# Patient Record
Sex: MALE | Race: WHITE | NOT HISPANIC OR LATINO
[De-identification: names, ages, dates, MRNs, and addresses within clinical notes are randomized per-mention and may not be internally consistent; named-entity substitution may affect disease eponyms.]

---

## 2021-09-29 PROBLEM — Z00.00 ENCOUNTER FOR PREVENTIVE HEALTH EXAMINATION: Status: ACTIVE | Noted: 2021-09-29

## 2021-10-05 ENCOUNTER — APPOINTMENT (OUTPATIENT)
Dept: NEUROSURGERY | Facility: CLINIC | Age: 81
End: 2021-10-05
Payer: MEDICARE

## 2021-10-05 DIAGNOSIS — G96.08 OTHER CRANIAL CEREBROSPINAL FLUID LEAK: ICD-10-CM

## 2021-10-05 PROCEDURE — 99205 OFFICE O/P NEW HI 60 MIN: CPT

## 2021-10-05 NOTE — ASSESSMENT
[FreeTextEntry1] : Mr. Tapia presents with a history of bilateral frontoparietal chronic subdural hematomas status post fall in August 2020. He is asymptomatic. CT head from 9/23/21 demonstrates an slight interval  DECREASE in size of the right frontoparietal subdural collection with new subacute blood products as well and a slight interval INCREASE in size of left frontoparietal subdural collection with interval decrease in blood product evolution. CT head reviewed independently by me and compared directly with prior CTs demonstrates stable volume of bilateral subdural fluid collections. \par \par Natural history discussed at length. Alternative management strategies reviewed, including continued observation with serial imaging, surgical evacuation, endovascular middle meningeal artery embolization. \par I have recommended short term surveillance with CT head without contrast in 2 weeks time with an appointment to follow. \par \par I have asked the patient and his wife to contact me for any symptomatic development or progression in the interim at which time we can obtain expedite follow up imaging, or for any additional questions or concerns that arise.\par \par A total of 60 minutes were spent relative to this encounter. \par

## 2021-10-05 NOTE — HISTORY OF PRESENT ILLNESS
[de-identified] : Mr. Tapia presents in neurosurgical consultation at the request of Dr. Mayank Sexton with his wife in attendance. He had a PMHx of chronic mild orthostatic hypotension, HLD,  and peptic ulcer disease. Patient states that in August of 2020 he presented to the emergency department at [facility] for evaluation of lightheadedness and syncope. A syncopal episode resulted in the patient striking the back of his head against the wall. Patient states that prior to his fall he had be experiencing watery diarrhea for several days which they presume led to an orthostatic hypotensive episode as well as GI bleeding with significant blood loss requiring a blood transfusion. CT head (8/28/20) at that time revealed bilateral subdural hygromas which were noted to have been present since 2017). Subsequent MRI brain revealed chronic bilateral subdural collections without significant mass effect in addition to evidence for small vessel ischemic changes. He presented with confusion throughout his hospital course with a normal electroencephalogram and was discharged home on 8/30/20 with no interval CT changes. Post admission serial CT imaging was managed under the direction of Dr. Sexton.  Recent CT head on 9/23 suggested a change in density and possible acuity of subdural blood products.  As per the patient, there has been no interval history of trauma. Repeat CT head performed today and will be reviewed below. \par \par Today he reports he just recently retired from being a  at [employer] and denies any neurological symptoms at this time.  \par \par \par \par

## 2021-10-05 NOTE — DATA REVIEWED
[de-identified] : CT HEAD WITHOUT CONTRAST: 10/4/2021: IMPRESSION- Bilateral frontoparietal subdural hematomas not significantly changed in size though both mildly increased in density compared to prior exam. The latter finding suggests interval component of more recent blood products\par CT HEAD WITHOUT CONTRAST: 9/23/2021: IMPRESSION- Slight interval decrease in size of right frontoparietal subdural collection though with mild interval increase in attenuation suggesting component of degeneration/ subacute blood products as well as a tiny hyperdense focus suggestive of acute blood products. Slight interval increase in size of left frontoparietal subdural collection with interval decrease in attenuation suggesting further evolution/ degeneration of blood products. \par CT HEAD WITHOUT CONTRAST: 8/28/20 - IMPRESSION :Prominence of the right greater than left frontal convexity CSF extra-axial\par space what appears to be mild mass effect on the frontal cortex, right greater\par than left. 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 [de-identified] : MRI BRAIN WITHOUT IV CONTRAST : 8/29/2020: IMPRESSION - 1.  Findings consistent with chronic bilateral subdural hematomas.\par \par 2.  No mass effect, significant midline shift or acute intracranial\par hemorrhage.\par \par 3.  There is no restricted diffusion to indicate an acute infarction.\par \par 4.  Involutional changes with parenchymal volume loss.\par \par 5.  Focal areas of abnormal white matter signal about the occipital horns of\par both lateral ventricles, which are most consistent with nonspecific gliosis.

## 2021-10-07 ENCOUNTER — TRANSCRIPTION ENCOUNTER (OUTPATIENT)
Age: 81
End: 2021-10-07

## 2021-10-26 ENCOUNTER — APPOINTMENT (OUTPATIENT)
Dept: NEUROSURGERY | Facility: CLINIC | Age: 81
End: 2021-10-26
Payer: MEDICARE

## 2021-10-26 PROCEDURE — 99215 OFFICE O/P EST HI 40 MIN: CPT | Mod: 95

## 2021-10-26 NOTE — DATA REVIEWED
[de-identified] : CT HEAD WITHOUT CONTRAST: 10/22/2021: IMPRESSION - Stable size and extent of the bilateral subdural collections as compared to \par  the imported exam from [facility] dated October 4, 2021. However, there \par  is slightly increased density within the right subdural collection which may \par  reflect interval hemorrhage \par

## 2021-10-26 NOTE — HISTORY OF PRESENT ILLNESS
[FreeTextEntry1] : Mr. Tapia has agreed to two-way audiovisual telehealth consultation. He is located at his  home [address] [place] [address]  and I am located at my office at [facility].\par \par Mr. Tapia presents for a a followup visit. He has no new neurologic symptoms.\par

## 2021-12-06 ENCOUNTER — NON-APPOINTMENT (OUTPATIENT)
Age: 81
End: 2021-12-06

## 2021-12-06 ENCOUNTER — APPOINTMENT (OUTPATIENT)
Dept: NEUROSURGERY | Facility: CLINIC | Age: 81
End: 2021-12-06
Payer: MEDICARE

## 2021-12-06 PROCEDURE — 99215 OFFICE O/P EST HI 40 MIN: CPT

## 2021-12-13 ENCOUNTER — APPOINTMENT (OUTPATIENT)
Dept: NEUROSURGERY | Facility: HOSPITAL | Age: 81
End: 2021-12-13

## 2021-12-16 ENCOUNTER — APPOINTMENT (OUTPATIENT)
Dept: NEUROSURGERY | Facility: HOSPITAL | Age: 81
End: 2021-12-16

## 2021-12-19 ENCOUNTER — RESULT REVIEW (OUTPATIENT)
Age: 81
End: 2021-12-19

## 2021-12-22 ENCOUNTER — RESULT REVIEW (OUTPATIENT)
Age: 81
End: 2021-12-22

## 2021-12-22 ENCOUNTER — APPOINTMENT (OUTPATIENT)
Dept: NEUROSURGERY | Facility: HOSPITAL | Age: 81
End: 2021-12-22

## 2021-12-23 ENCOUNTER — RESULT REVIEW (OUTPATIENT)
Age: 81
End: 2021-12-23

## 2021-12-23 ENCOUNTER — NON-APPOINTMENT (OUTPATIENT)
Age: 81
End: 2021-12-23

## 2021-12-23 DIAGNOSIS — S06.5X9A TRAUMATIC SUBDURAL HEMORRHAGE WITH LOSS OF CONSCIOUSNESS OF UNSPECIFIED DURATION, INITIAL ENCOUNTER: ICD-10-CM

## 2022-01-03 ENCOUNTER — NON-APPOINTMENT (OUTPATIENT)
Age: 82
End: 2022-01-03

## 2022-01-03 DIAGNOSIS — I82.499 ACUTE EMBOLISM AND THROMBOSIS OF OTHER SPECIFIED DEEP VEIN OF UNSPECIFIED LOWER EXTREMITY: ICD-10-CM

## 2022-01-18 ENCOUNTER — APPOINTMENT (OUTPATIENT)
Dept: NEUROSURGERY | Facility: CLINIC | Age: 82
End: 2022-01-18
Payer: MEDICARE

## 2022-01-18 PROCEDURE — 99215 OFFICE O/P EST HI 40 MIN: CPT

## 2022-01-18 NOTE — PHYSICAL EXAM
[Oriented To Time, Place, And Person] : oriented to person, place, and time [Cranial Nerves Optic (II)] : visual acuity intact bilaterally,  pupils equal round and reactive to light [Cranial Nerves Oculomotor (III)] : extraocular motion intact [Cranial Nerves Trigeminal (V)] : facial sensation intact symmetrically [Cranial Nerves Facial (VII)] : face symmetrical [Cranial Nerves Vestibulocochlear (VIII)] : hearing was intact bilaterally [Cranial Nerves Glossopharyngeal (IX)] : tongue and palate midline [Cranial Nerves Accessory (XI - Cranial And Spinal)] : head turning and shoulder shrug symmetric [Cranial Nerves Hypoglossal (XII)] : there was no tongue deviation with protrusion [Motor Strength] : muscle strength was normal in all four extremities [General Appearance - Alert] : alert [General Appearance - In No Acute Distress] : in no acute distress [General Appearance - Well Nourished] : well nourished [General Appearance - Well Developed] : well developed [Sensation Tactile Decrease] : light touch was intact [Balance] : balance was intact [FreeTextEntry1] : right groin: soft, non-tender, no ecchymosis.  2+ distal pulses

## 2022-01-18 NOTE — HISTORY OF PRESENT ILLNESS
[FreeTextEntry1] : Mr. Tapia presents today in neurosurgical follow up.  He underwent diagnostic cerebral angiography with successful bilateral middle meningeal artery 12/22/21 without complication for bilateral enlarging subacute on chronic subdural hematomas. He presents with surveillance imaging, which will be reviewed below.\par \par Approximately two weeks ago he began experiencing pain in the right knee, which was the same limb accessed for the procedure.  He underwent lower extremity venous duplex which ruled out presence of DVT on 1/4.  His symptoms abated but however last week he began experiencing right ankle pain and swelling.  He sought consultation from his PCP and was diagnosed with a right ankle sprain.\par \par Today, patient states his ankle pain and swelling have improved. Denies headaches, visual disturbances, extremity numbness/weakness, gait instability.

## 2022-01-18 NOTE — DATA REVIEWED
[de-identified] : PROCEDURE: CT HEAD OR BRAIN \par \par ORDER #: [id number] \par CC: ; ; ; \par End of cc's \par \par CT SCAN OF THE HEAD WITHOUT CONTRAST: \par \par  HISTORY:81 years Male S06.5X9ACT \par \par  TECHNIQUE: CT head performed using 5mm axial collimation Helical scanning was \par  performed without contrast administration. Reformatted images in the coronal \par  and sagittal plane were produced. Automated exposure control (AEC) adjustment \par  of the mA and/or KVP has been used according to patient's size for the purpose \par  of dose reduction. \par \par \par  COMPARISON: December 23, 2021. \par \par  FINDINGS: \par \par  Redemonstrated on today's exam is a complex mixed density right subdural \par  collection which appears overall decreased in attenuation as compared to the \par  December 23, 2021 exam but stable or slightly improved in size. At greatest \par  radial depth the collection measures 1.4 cm overlying the right frontoparietal \par  junction as compared to 17 mm on the prior exam. \par \par  A smaller left convexity mixed density subdural collection is present which at \par  maximal radial thickness measures up to 6.5 mm as compared to 7.5 mm on the \par  previous exam. Subtle leftward midline shift is again seen, less than 5 mm and \par  relatively stable as compared to prior exam. \par \par  No new intraparenchymal hemorrhage or hydrocephalus is present. \par \par  IMPRESSION: \par \par  Mixed density bilateral subdural collections larger on the right than the left \par  are stable or slightly improved as compared to the December 23, 2021 exam with \par  interval decrease in density consistent with evolution of blood products and \par  probably subtle decrease in size bilaterally resulting in stable minimal \par  leftward midline shift.. \par \par  Thank you for allowing us to participate in the evaluation of this patient. \par \par  --- End of Report --- \par \par  Electronically Signed: \par  ____________________________________________ \par  Leonard Arora MD \par  01/14/22 1122

## 2022-01-18 NOTE — ASSESSMENT
[FreeTextEntry1] : Mr. Tapia presents today s/p successful bilateral middle meningeal arteriy embolization for treatment of bilateral, enlarging subacute on chronic subdural hematomas.  Follow up CT reviewed independently by me demonstrates  interval improvement in volume of subdural blood and fluid products. \par \par I plan to obtain surveillance CT head in 3 weeks with an appointment to follow.  I have asked the patient to contact me for any symptomatic development or progression at which time we can obtain expedited follow up imaging.\par \par Approximately 45 minutes were spent relative to this encounter. \par

## 2022-02-08 ENCOUNTER — APPOINTMENT (OUTPATIENT)
Dept: NEUROSURGERY | Facility: CLINIC | Age: 82
End: 2022-02-08

## 2022-02-10 ENCOUNTER — APPOINTMENT (OUTPATIENT)
Dept: NEUROSURGERY | Facility: CLINIC | Age: 82
End: 2022-02-10
Payer: MEDICARE

## 2022-02-10 PROCEDURE — XXXXX: CPT | Mod: 1L

## 2022-02-10 NOTE — ASSESSMENT
[FreeTextEntry1] : Mr. Tapia presents today with a history of successful bilateral middle meningeal artery embolization for treatment of bilateral, enlarging subacute on chronic subdural hematomas. He remains stable clinically and radiographically. Follow up CT reviewed, demonstrates interval improvement in volume of subdural blood.\par \par I plan to obtain surveillance noncontrast CT head in 3 weeks with an appointment to follow. I have asked the patient to contact me for any symptomatic development or progression at which time we can obtain expedited follow up imaging.\par

## 2022-02-10 NOTE — HISTORY OF PRESENT ILLNESS
[FreeTextEntry1] : Mr. Tapia has agreed to two-way audiovisual telehealth consultation. She is located at her home [address] [place] [address] and I am located at my office at [facility].\par \par \par \par \par  presents today in neurosurgical follow up .Previous notes and interval history reviewed. Denies neurological symptoms. He presents with surveillance imaging, noncontrast CT head from 2/8/22, detailed below.

## 2022-02-10 NOTE — DATA REVIEWED
[de-identified] : CT HEAD NO CONTRAST : 2/8/2022: IMPRESSION:  Stable right convexity mixed density collection and stable much smaller left \par  convexity collection as compared to January 13, 2022 study. \par \par  Thank you for allowing us to participate in the evaluation of this patient

## 2022-03-08 ENCOUNTER — APPOINTMENT (OUTPATIENT)
Dept: NEUROSURGERY | Facility: CLINIC | Age: 82
End: 2022-03-08
Payer: MEDICARE

## 2022-03-08 ENCOUNTER — NON-APPOINTMENT (OUTPATIENT)
Age: 82
End: 2022-03-08

## 2022-03-08 PROCEDURE — 99213 OFFICE O/P EST LOW 20 MIN: CPT

## 2022-04-08 ENCOUNTER — NON-APPOINTMENT (OUTPATIENT)
Age: 82
End: 2022-04-08

## 2022-04-08 NOTE — DATA REVIEWED
[de-identified] : CT HEAD WITHOUT CONTRAST: 4/4/22:IMPRESSION: \par  Bilateral chronic frontoparietal subdural collections which are unchanged from \par  the prior study dated 3/4/2020. Stable 3 mm leftward midline shift. No new \par  intracranial hemorrhage, vasogenic edema or hydrocephalus. \par

## 2022-04-08 NOTE — PHYSICAL EXAM
[General Appearance - Alert] : alert [General Appearance - In No Acute Distress] : in no acute distress [General Appearance - Well Nourished] : well nourished [Oriented To Time, Place, And Person] : oriented to person, place, and time [Impaired Insight] : insight and judgment were intact

## 2022-04-08 NOTE — HISTORY OF PRESENT ILLNESS
[FreeTextEntry1] : Mr. Tapia has agreed to two-way audiovisual telehealth consultation. She is located at her home [address] [place] [address] and I am located at my office at [facility].\par \par  presents today in neurosurgical follow up .Previous notes and interval history reviewed. Denies neurological symptoms. He presents with surveillance imaging, noncontrast CT head from 4/4/22, detailed below. \par

## 2022-04-08 NOTE — ASSESSMENT
[FreeTextEntry1] : Mr. Tapia presents today with a history of successful bilateral middle meningeal artery embolization for treatment of bilateral, enlarging subacute on chronic subdural hematomas. He remains stable clinically and radiographically. Follow up CT reviewed with Dr. Reed, demonstrates stability  in volume of subdural blood, when compared to prior CT head from 3/4/22.\par \par I plan to obtain surveillance noncontrast CT head in 1 month with an appointment to follow. I have asked the patient to contact me or Dr. Sharif for any symptomatic development or progression at which time we can obtain expedited follow up imaging.\par \par A total of 15 minutes were spent relative to this encounter.

## 2022-05-16 ENCOUNTER — NON-APPOINTMENT (OUTPATIENT)
Age: 82
End: 2022-05-16

## 2022-06-14 ENCOUNTER — NON-APPOINTMENT (OUTPATIENT)
Age: 82
End: 2022-06-14

## 2022-09-05 ENCOUNTER — RESULT REVIEW (OUTPATIENT)
Age: 82
End: 2022-09-05

## 2022-09-07 ENCOUNTER — APPOINTMENT (OUTPATIENT)
Dept: NEUROSURGERY | Facility: CLINIC | Age: 82
End: 2022-09-07
Payer: MEDICARE

## 2022-09-07 PROCEDURE — 99212 OFFICE O/P EST SF 10 MIN: CPT | Mod: 1L,95

## 2022-09-09 NOTE — HISTORY OF PRESENT ILLNESS
[FreeTextEntry1] : Mr. Tapia has agreed to two-way audiovisual telehealth consultation. He is located at his home [address] [place] [address] and I am located at my office at [facility].\par \par Mr. Tapia presents in neurosurgical follow up. Previous notes and interval history reviewed. Surveillance CT head from 9/6/22, detailed below. Denies other neurological symptoms at this time. \par \par \par

## 2022-09-09 NOTE — ASSESSMENT
[FreeTextEntry1] : 81 yo male presents for surveillance CT head. CT from 9/6/22 reviewed by Dr. Reed and myself, demonstrates  stability and interval improvement in volume of subdural blood, when compared to prior CT head from 6/6/22. He remains clinically and radiographically stable. I plan to obtain surveillance noncontrast CT head in 3 months with a telehealth appointment to follow. I have asked the patient to contact me or Dr. Reed for any symptomatic development or progression at which time we can obtain expedited follow up imaging.

## 2022-09-09 NOTE — DATA REVIEWED
[de-identified] : CT HEAD WITH AND WITHOUT CONTRAST: 9/6/22: IMPRESSION- Mixed density right frontal and parietal convexity subdural hematoma decreased \par  in size and attenuation compared with prior exam from 6/6/2022; currently \par  measuring up to 9 mm in maximum thickness over the right parietal convexity \par  and previously measuring up to 14 mm. Thin low-attenuation left frontotemporal \par  convexity subdural collection unchanged measuring up to 8 mm in maximum \par  thickness. Persistent 2-3 mm shift of the midline towards the left.

## 2022-12-18 ENCOUNTER — RESULT REVIEW (OUTPATIENT)
Age: 82
End: 2022-12-18

## 2022-12-21 ENCOUNTER — APPOINTMENT (OUTPATIENT)
Dept: NEUROSURGERY | Facility: CLINIC | Age: 82
End: 2022-12-21
Payer: MEDICARE

## 2022-12-21 PROCEDURE — 99212 OFFICE O/P EST SF 10 MIN: CPT | Mod: 95

## 2022-12-21 PROCEDURE — 99202 OFFICE O/P NEW SF 15 MIN: CPT | Mod: 95

## 2023-01-01 ENCOUNTER — NON-APPOINTMENT (OUTPATIENT)
Age: 83
End: 2023-01-01

## 2023-01-01 ENCOUNTER — APPOINTMENT (OUTPATIENT)
Dept: NEUROSURGERY | Facility: CLINIC | Age: 83
End: 2023-01-01
Payer: MEDICARE

## 2023-01-01 ENCOUNTER — TRANSCRIPTION ENCOUNTER (OUTPATIENT)
Age: 83
End: 2023-01-01

## 2023-01-01 ENCOUNTER — RESULT REVIEW (OUTPATIENT)
Age: 83
End: 2023-01-01

## 2023-01-01 PROCEDURE — 99215 OFFICE O/P EST HI 40 MIN: CPT | Mod: 95

## 2023-01-18 NOTE — ASSESSMENT
[FreeTextEntry1] : Mr. Tapia presents for a follow up with a history of bilateral subdural fluid collections. He remains clinically stable. CT head 10/22/21 reviewed independently by me demonstrates stability of bilateral subdural fluid collection with perhaps modest interval improvement. Given patient's clinical and radiographic stability, I have recommended  surveillance CT head without contrast in 2-3 weeks time with an appointment to follow. \par \par I have asked the patient and his wife to contact me for any symptomatic development or progression in the interim at which time we can obtain expedited follow up imaging, or for any additional questions or concerns that arise.\par \par A total of 40 minutes were spent relative to this encounter.  Initiate Treatment: Cerave hydrating cleanser Detail Level: Zone

## 2023-03-29 NOTE — ASSESSMENT
[FreeTextEntry1] : 83 yo male presents for surveillance CT head. CT from 12/19/22 reviewed by Dr. Reed and myself, demonstrates stability and interval improvement in volume of subdural blood, when compared to prior CT head from 9/6/22. He remains clinically and radiographically stable. I plan to obtain surveillance noncontrast CT head in 3 months with a telehealth appointment to follow. I have asked the patient to contact me or Dr. Reed for any symptomatic development or progression at which time we can obtain expedited follow up imaging.

## 2023-03-29 NOTE — PHYSICAL EXAM
[General Appearance - In No Acute Distress] : in no acute distress [General Appearance - Alert] : alert [General Appearance - Well Nourished] : well nourished [Oriented To Time, Place, And Person] : oriented to person, place, and time [Affect] : the affect was normal [Impaired Insight] : insight and judgment were intact

## 2023-03-29 NOTE — ASSESSMENT
[FreeTextEntry1] : 83 yo male presents for surveillance CT head. CT from 3/27/23 reviewed by Dr. Reed and myself, demonstrates stability and continued interval improvement in volume of subdural blood, when compared to prior CT head from 12/19/22. He remains clinically stable. I plan to obtain surveillance noncontrast CT head in 6 months with telehealth appointment to follow. I have asked the patient to contact me or Dr. Reed for any symptomatic development or progression at which time we can obtain expedited follow up imaging. \par \par A total of 45 minutes were spent relative to this encounter.\par \par

## 2023-03-29 NOTE — DATA REVIEWED
[de-identified] : CT HEAD WITHOUT CONTRAST" 12/19/22: IMPRESSION:\par Small chronic bilateral subdural collections, larger on the right.\par \par No acute intracranial hemorrhage.

## 2023-03-29 NOTE — DATA REVIEWED
[de-identified] : CT HEAD WITHOUT CONTRAST: 3/27/23: IMPRESSION- Stable chronic bilateral subdural collections. No acute intracranial\par hemorrhage, significant mass effect, midline shift, or hydrocephalus.

## 2023-03-29 NOTE — HISTORY OF PRESENT ILLNESS
[FreeTextEntry1] : Mr. Tapia has agreed to two-way audiovisual telehealth consultation. He is located at his home [address] [place] [address] and I am located at my office at [facility].\par \par Mr. Tapia presents in neurosurgical follow up. Previous notes and interval history reviewed. Surveillance CT head from 3/27/23, detailed below. Denies other neurological symptoms at this time.

## 2023-03-29 NOTE — HISTORY OF PRESENT ILLNESS
[FreeTextEntry1] : Mr. Tapia has agreed to two-way audiovisual telehealth consultation. He is located at his home [address] [place] [address] and I am located at my office at [facility].\par \par Mr. Tapia presents in neurosurgical follow up. Previous notes and interval history reviewed. Surveillance CT head from 12/19/22, detailed below. Denies other neurological symptoms at this time. \par \par

## 2024-01-01 ENCOUNTER — RESULT REVIEW (OUTPATIENT)
Age: 84
End: 2024-01-01

## 2024-01-01 ENCOUNTER — TRANSCRIPTION ENCOUNTER (OUTPATIENT)
Age: 84
End: 2024-01-01

## 2024-01-01 ENCOUNTER — APPOINTMENT (OUTPATIENT)
Dept: THORACIC SURGERY | Facility: CLINIC | Age: 84
End: 2024-01-01

## 2024-01-01 ENCOUNTER — APPOINTMENT (OUTPATIENT)
Dept: THORACIC SURGERY | Facility: HOSPITAL | Age: 84
End: 2024-01-01

## 2024-02-05 ENCOUNTER — TRANSCRIPTION ENCOUNTER (OUTPATIENT)
Age: 84
End: 2024-02-05

## 2024-02-05 ENCOUNTER — APPOINTMENT (OUTPATIENT)
Dept: THORACIC SURGERY | Facility: HOSPITAL | Age: 84
End: 2024-02-05